# Patient Record
(demographics unavailable — no encounter records)

---

## 2024-11-25 NOTE — DEVELOPMENTAL MILESTONES
[None] : none [Goes to the bathroom and has] : goes to bathroom and has bowel movement by self [Plays make-believe] : plays make-believe [Uses 4-word sentences] : uses 4-word sentences [Climbs stairs, alternating feet] : climbs stairs, alternating feet without support [Skips on one foot] : skips on one foot [Grasps a pencil with thumb and] : grasps a pencil with thumb and fingers instead of fist [Dresses and undresses without] : does not dress and undress without much help [Uses words that are 100%] : does not use words that are 100% intelligible to strangers [Tells a story from a book] : does not tell a story from a book [Draws a person with head and] : does not draw a person with head and 3 body part [Draws a simple cross] : does not draw a simple cross [Unbuttons medium-sized buttons] : does not unbutton medium-sized buttons [Draws recognizable pictures] : does not draw recognizable pictures [FreeTextEntry1] : Child has mild autism

## 2024-11-25 NOTE — REVIEW OF SYSTEMS
[Cough] : cough [Negative] : Genitourinary [Irritable] : no irritability [Inconsolable] : consolable [Fussy] : not fussy [Crying] : no crying [Malaise] : no malaise [Headache] : no headache [Eye Pain] : no eye pain [Eye Discharge] : no eye discharge [Eye Redness] : no eye redness [Increased Lacrimation] : no increased lacrimation [Ear Pain] : no ear pain [Nasal Discharge] : no nasal discharge [Nasal Congestion] : no nasal congestion [Snoring] : no snoring [Mouth Breathing] : no mouth breathing [Dysconjugate gaze] : no dysconjugate gaze [Cyanosis] : no cyanosis [Tachypnea] : not tachypneic [Wheezing] : no wheezing

## 2024-11-25 NOTE — HISTORY OF PRESENT ILLNESS
[Parents] : parents [whole ___ oz/d] : consumes [unfilled] oz of whole cow's milk per day [Fruit] : fruit [Vegetables] : vegetables [Meat] : meat [Grains] : grains [Eggs] : eggs [___ stools per day] : [unfilled]  stools per day [___ voids per day] : [unfilled] voids per day [Wakes up at night] : Wakes up at night [Bottle Use] : Bottle use [Brushing teeth] : Brushing teeth [In Pre-K] : In Pre-K [Playtime (60 min/d)] : Playtime 60 min a day [< 2 hrs of screen time] : Less than 2 hrs of screen time [Appropiate parent-child communication] : Appropriate parent-child communication [Child given choices] : Child given choices [Child Cooperates] : Child cooperates [Parent has appropriate responses to behavior] : Parent has appropriate responses to behavior [No] : Not at  exposure [Car seat in back seat] : Car seat in back seat [Carbon Monoxide Detectors] : Carbon monoxide detectors [Smoke Detectors] : Smoke detectors [Supervised outdoor play] : Supervised outdoor play [Up to date] : Up to date [YES] : Yes [de-identified] : dry cough  [FreeTextEntry3] : sleep with parents  [de-identified] : patient can use a straw and drink out of a glass [FreeTextEntry1] : Patient is a 4y female with a pmh of autism spectrum disorder, and potential lead ingestion who presents for evaluation of a cough, and well child visit. The patient was given a diagnosis of pneumonia in the ED last month and underwent a course of amoxicillin and a clear chest x-ray. The parents endorse and on and off dry cough for the last month. The parents deny any lethargy, changes in baseline activity, or appetite. The parents report that the patient is currently taking liquid iron 2/2 to a lead exposure in 2022. The patient is traveling to [facility] on December 5th and returning on January 20th.    SDOH Screening Questionnaire   SDOH (Social Determinants of Health) Questionnaire: 1. Housing: Do you worry that in the upcoming months, your family, or child, may not have a safe or stable place to live? no 2. Food security: Within the last 12 months, did the food you bought not last and you did not have money to buy more? no 3. Community: Do you need help getting public benefits like food stamps or WIC? no 4. Transportation: Does your child have chronic medical condition and therefore struggle with transportation to attend medical appointments? no 5. Healthcare Access: Do you need help getting health or dental insurance? no       Result: Negative Screen. No further intervention needed.

## 2024-11-25 NOTE — DISCUSSION/SUMMARY
[Normal Growth] : growth [No Skin Concerns] : skin [Normal Sleep Pattern] : sleep [Delayed Language Skills] : delayed language skills [Autism] : autism [Anticipatory Guidance Given] : Anticipatory guidance addressed as per the history of present illness section [DTaP] : diptheria, tetanus and pertussis [Influenza] : influenza [IPV] : inactivated poliovirus [MMR] : measles, mumps and rubella [Varicella] : varicella [No Medication Changes] : No medication changes at this time [Parent/Guardian] : Parent/Guardian [] : The components of the vaccine(s) to be administered today are listed in the plan of care. The disease(s) for which the vaccine(s) are intended to prevent and the risks have been discussed with the caretaker.  The risks are also included in the appropriate vaccination information statements which have been provided to the patient's caregiver.  The caregiver has given consent to vaccinate. [No Elimination Concerns] : elimination [Continue Regimen] : feeding [FreeTextEntry1] : The patient is a 4-year-old female with a pmh of autism spectrum disorder and lead exposure who presents for a well child visit and evaluation of a cough. Growth is normal and child shows fine motor and language delays. The parents endorse an on and off dry cough for the last month following an episode of pneumonia treated with amoxicillin. Physical Exam was unremarkable. Vision and Auditory screening were not performed. Vaccines are not up to date. The patients are traveling to [facility] from 12/5/24-1/5/25.   -Routine Care and anticipatory guidance given -Vaccines Given: Influenza, Proquad, Kinrix  - Blood Lead Level  - RTC within 2 weeks of returning from [facility] for Lead levels  - Referral to Developmental Pediatrics   Caretaker expressed understanding of the plan and agrees. All questions were answered.

## 2025-01-22 NOTE — HISTORY OF PRESENT ILLNESS
[de-identified] : lead test [FreeTextEntry6] : 4 year old female with pmh elevated lead level after traveling to [facility] who return to the office today for lead test after traveling for 6 weeks to [place] and [place]. Mother reports that Carole was "not really" putting non food items in her mouth.   Mother reports that Carole had 2 days of vomiting in [place] but recovered. She has developed loose stools, non bloody, since returning from [place]. No fevers and she is eating and drinking normally. Mother has no other concerns today. Reportedly, Carole did very well with traveling by airplane.

## 2025-01-22 NOTE — DISCUSSION/SUMMARY
[FreeTextEntry1] : 4 year old female with pmh elevated lead level after traveling to [place] who return to the office today for lead test after traveling for 6 weeks to [place] and [place] [facility], from 12/5-1/20/25. She has history of lead level highest 4.2 after returning from travel to [place]. Latest level 1.3 in November 2024.  PE shows well appearing, well hydrated child in no acute distress.  Mother instructed to take Carole for lead test after travel. Mother reports that she will complete it in a week's time. Carole will be due for weight check next month and mother will schedule it today.  In order to maintain hydration consume "oral rehydration solution," such as Pedialyte or low calorie sports drinks. If vomiting, try to give child a few teaspoons of fluid every few minutes. Avoid drinking juice or soda. These can make diarrhea worse. If tolerating solids, its best to consume lean meats, fruits, vegetables, and whole-grain breads and cereals. Avoid eating foods with a lot of fat or sugar, which can make symptoms worse. Seek immediate medical attention if she is unable to maintain fluids by mouth or if there is bloody diarrhea, bloody or bright green vomiting, > 5 episodes diarrhea or vomiting or for any other concerning signs or symptoms  Caretaker expressed understanding of the plan and agreed. All of their questions were answered.

## 2025-03-02 NOTE — DISCUSSION/SUMMARY
[FreeTextEntry1] : 3 yo female presenting for weight check. Decrease in weight was noted at last visit as compared to her visit on 11/25/24. No major illnesses in the interim. Reported appetite at her baseline. It is likely that her weight in November 2024 was in error, as there is no explainable cause for documented decrease in her weight. Her BMI is normal. Parents encouraged to continue to seek out HERNAN therapy. Parents were encouraged to complete repeat lead test, they agreed to get it completed in a weeks time. RTC for 4 yo HCM and prn.  Caretaker expressed understanding of the plan and agreed. All of their questions were answered.

## 2025-03-02 NOTE — HISTORY OF PRESENT ILLNESS
[FreeTextEntry6] : 3 yo female presenting for weight check. Decrease in weight was noted at last visit as compared to her visit on 11/25/24. The family spent time in [place] and elsewhere in the [place]. She had a mild viral illness but no hospitalizations. Her appetite is at her baseline. There is no vomiting or diarrhea.   Of note, they are continuing to try and find HERNAN therapy for Carole. I had given the family a list at her last visit but none of the listed offices accept Carole's insurance. Parents will switch her from [employer] to [employer] in an attempt to see if the providers will be in network. Carole is in pre school and parents report progress.  They had not yet completed repeat lead test as was recommended after traveling outside of the [place]. She had traveled once before to [place], lead level was check and was 4.7, it has been downtrending ever since. [de-identified] : weight check

## 2025-04-10 NOTE — HISTORY OF PRESENT ILLNESS
[de-identified] : Elevated lead level [FreeTextEntry6] : 5yo with elevated lead level presenting for follow-up. Last lead level repeated 1 month ago (1.5).  Of note, patient visited ED 2.5 weeks ago and treated for a pneumonia with amoxicillin, completed 10-day course. Symptoms have resolved. Today, mother recorded temperature 100F this morning and gave Tylenol 3 hours ago. Had x1 episode of NBNB emesis. Otherwise well in herself. PO intake, voiding, and stooling at baseline Denies diarrhea, rash, cough, sick contacts.  Mother requesting need for HERNAN therapy referral signed by doctor and developmental pediatrician referral.

## 2025-04-10 NOTE — HISTORY OF PRESENT ILLNESS
[de-identified] : Elevated lead level [FreeTextEntry6] : 5yo with elevated lead level presenting for follow-up. Last lead level repeated 1 month ago (1.5).  Of note, patient visited ED 2.5 weeks ago and treated for a pneumonia with amoxicillin, completed 10-day course. Symptoms have resolved. Today, mother recorded temperature 100F this morning and gave Tylenol 3 hours ago. Had x1 episode of NBNB emesis. Otherwise well in herself. PO intake, voiding, and stooling at baseline Denies diarrhea, rash, cough, sick contacts.  Mother requesting need for HERNAN therapy referral signed by doctor and developmental pediatrician referral.

## 2025-04-10 NOTE — DISCUSSION/SUMMARY
[FreeTextEntry1] : 3yo with elevated lead level presenting to clinic for follow-up. Completed course of abx for pneumonia 2 weeks with symptoms resolution. PE unremarkable. Clear lungs to auscultation bilaterally.   - Lead level 1.5. Repeat lead level in September - HERNAN referral script handed to mother - Developmental referral given for evaluation as required for HERNAN therapy - RTC in _______ for follow-up - RTC for 6yo WCC and prn for any concerns

## 2025-07-25 NOTE — PHYSICAL EXAM
[NL] : moves all extremities x4, warm, well perfused x4 [FreeTextEntry3] : uncooperative with exam [de-identified] : uncooperative with exam

## 2025-07-25 NOTE — DISCUSSION/SUMMARY
[FreeTextEntry1] : 3 yo female who presents today with her parents who are requesting further evaluation for a possible cause of Carole'a autism.  Genetics referral given for parent's request for chromosomal testing and testing for beta thalassemia trait.  Allergy referral given for food allergy testing although suspicion for food allergy is low since Carole can tolerate a regular diet without any allergic reaction. Reviewed that her bowel movements appear to be normal.  In regards to concern for gluten sensitivity, she is asymptomatic but parents request screening thus celiac screen ordered.  In regards to testing for heavy metals, I have reviewed with parents that I do not routinely check for heavy metals in not at risk children, besides lead levels which Caorle has been screened for and has decreasing lead levels. Instead, they may seek this type of testing at [facility]. Information to be shared with the family. In regards to "pica" and possible iron deficiency, we may check CBC and iron studies.  RTC for 6 yo HCM and prn.  Caretaker expressed understanding of the plan and agreed. All of their questions were answered.  Total clinician time spent on today is 40 minutes including preparing to see the patient, obtaining and/or reviewing and confirming history, performing a medically necessary and appropriate examination, counseling and educating the family on their numerous concerns, documenting clinical information in the EHR and communicating and/or referring to other healthcare professionals.

## 2025-07-25 NOTE — HISTORY OF PRESENT ILLNESS
[de-identified] : testing for autism [FreeTextEntry6] : 5 yo female who presents today with her parents who are requesting further evaluation for a possible cause of Carole'a autism.  Parents report that in the interim, they have found video evaluation for HERNAN therapy. She had received it before through [facility] but they believe that she should continue it.  Recently, parents have found out through genetic testing prior to trying to have a second child that both parents are carriers for beta thalassemia. They are requesting that Carole also be tested for this.   In addition, they would like chromosomal testing for their child.   They also note that Carole stools 4-5 times daily, of normal consistency. No abdominal pain, no blood in stool, no diarrhea. They are worried that this may mean that she has gluten sensitivity or a food allergy. They would like for her to be tested for both. There is a family history of celiac disease in paternal grandfather.  Parents would also like testing for "pica" since Carole seems to put non food items in her mouth. She chews on things, she will put dirt or rocks into her mouth. Parents are adamant that Carole is always supervised so she has not swallowed non food items.   Parents would also like to have Carole tested for "heavy metals" to rule it out as a cause of her autism.